# Patient Record
Sex: FEMALE | URBAN - METROPOLITAN AREA
[De-identification: names, ages, dates, MRNs, and addresses within clinical notes are randomized per-mention and may not be internally consistent; named-entity substitution may affect disease eponyms.]

---

## 2021-05-17 ENCOUNTER — OFFICE VISIT (OUTPATIENT)
Dept: URBAN - METROPOLITAN AREA CLINIC 23 | Facility: CLINIC | Age: 66
End: 2021-05-17
Payer: COMMERCIAL

## 2021-05-17 ENCOUNTER — OFFICE VISIT (OUTPATIENT)
Dept: URBAN - METROPOLITAN AREA CLINIC 32 | Facility: CLINIC | Age: 66
End: 2021-05-17
Payer: COMMERCIAL

## 2021-05-17 DIAGNOSIS — T85.22XA DISPLACEMENT OF INTRAOCULAR LENS, INITIAL ENCOUNTER: Primary | ICD-10-CM

## 2021-05-17 DIAGNOSIS — H27.131: Primary | ICD-10-CM

## 2021-05-17 DIAGNOSIS — H43.311 VITREOUS MEMBRANES AND STRANDS, RIGHT EYE: ICD-10-CM

## 2021-05-17 PROCEDURE — 99214 OFFICE O/P EST MOD 30 MIN: CPT | Performed by: OPHTHALMOLOGY

## 2021-05-17 PROCEDURE — 99204 OFFICE O/P NEW MOD 45 MIN: CPT | Performed by: OPHTHALMOLOGY

## 2021-05-17 PROCEDURE — 92134 CPTRZ OPH DX IMG PST SGM RTA: CPT | Performed by: OPHTHALMOLOGY

## 2021-05-17 RX ORDER — PREDNISOLONE ACETATE 10 MG/ML
1 % SUSPENSION/ DROPS OPHTHALMIC
Qty: 10 | Refills: 5 | Status: INACTIVE
Start: 2021-05-17 | End: 2021-09-14

## 2021-05-17 RX ORDER — OFLOXACIN 3 MG/ML
0.3 % SOLUTION/ DROPS OPHTHALMIC
Qty: 5 | Refills: 3 | Status: INACTIVE
Start: 2021-05-17 | End: 2021-09-14

## 2021-05-17 ASSESSMENT — INTRAOCULAR PRESSURE
OD: 22
OS: 21
OD: 22
OS: 21

## 2021-05-17 NOTE — IMPRESSION/PLAN
Impression: Displacement of intraocular lens, initial encounter: T85.22XA. Plan: Discussed diagnosis in detail with patient. Discussed treatment plan with patient. Patient understands Oculoplastics surgical intervention with myself is not required. Patient referred to see Dr. Robert Vazquez, same day. No further Oculoplastics intervention needed at this time. *Patient is currently being treated during COVID-19 epidemic, which limits our availability to establish proper follow up care. Patient understands these limitations, and is aware that we will continue treatment and follow up based on our availability, as determined by local state and federal guidelines.

## 2021-05-17 NOTE — IMPRESSION/PLAN
Impression: Vitreous membranes and strands, right eye: H43.311. Right. Condition: unstable. Vision: vision affected. Plan: Discussed diagnosis in detail with patient. Surgical treatment is required - see notes above.

## 2021-05-17 NOTE — IMPRESSION/PLAN
Impression: Dislocated post lens of rt eye post Trauma: H27.131. Right. Condition: new problem addtl w/u needed. Vision: vision affected. Plan: Discussed diagnosis in detail with patient. Discussed risks of progression. Surgical treatment is required to remove dislocated lens, recommend TSPCIOL RIGHT EYE for possible vision improvement. Discussed surgery in great length. Surgical risks and benefits were discussed, explained and understood by patient. All questions answered. Patient elects to proceed with recommendation. RL1. OCT unable to obtain OD. Erx Prednisolone and Ofloxacin to patient's pharmacy. Advise patient surgery is not an emergency. Start Prednisolone ASAP to be used OD QID.

## 2021-05-24 ENCOUNTER — TESTING ONLY (OUTPATIENT)
Dept: URBAN - METROPOLITAN AREA CLINIC 32 | Facility: CLINIC | Age: 66
End: 2021-05-24
Payer: COMMERCIAL

## 2021-05-24 ASSESSMENT — PACHYMETRY
OD: 2.49
OS: 22.72
OD: 22.57
OS: 2.34

## 2021-06-08 ENCOUNTER — SURGERY (OUTPATIENT)
Dept: URBAN - METROPOLITAN AREA SURGERY 11 | Facility: SURGERY | Age: 66
End: 2021-06-08
Payer: COMMERCIAL

## 2021-06-08 DIAGNOSIS — H33.011 RETINAL DETACHMENT WITH SINGLE BREAK, RIGHT EYE: Primary | ICD-10-CM

## 2021-06-08 PROCEDURE — 67108 REPAIR DETACHED RETINA: CPT | Performed by: OPHTHALMOLOGY

## 2021-06-09 ENCOUNTER — POST-OPERATIVE VISIT (OUTPATIENT)
Dept: URBAN - METROPOLITAN AREA CLINIC 32 | Facility: CLINIC | Age: 66
End: 2021-06-09

## 2021-06-09 PROCEDURE — 99024 POSTOP FOLLOW-UP VISIT: CPT | Performed by: OPTOMETRIST

## 2021-06-09 RX ORDER — BRIMONIDINE TARTRATE, TIMOLOL MALEATE 2; 5 MG/ML; MG/ML
SOLUTION/ DROPS OPHTHALMIC
Qty: 15 | Refills: 3 | Status: INACTIVE
Start: 2021-06-09 | End: 2021-09-06

## 2021-06-09 ASSESSMENT — INTRAOCULAR PRESSURE
OD: 34
OS: 14

## 2021-06-09 NOTE — IMPRESSION/PLAN
Impression: S/P Laser photocoagulation; PPVx 25g;            Endo laser; Intraocular Injections vitreous substitutes OD - 1 Day. Encounter for surgical aftercare following surgery on a sense organ  Z48.810. Plan: The surgical eye(s) is improving well. Continue to follow current drop plan and post operative instructions. Recommend artificial tears throughout post operative period.  RTC for scheduled follow up. start combigan BID OD

## 2021-06-14 ENCOUNTER — POST-OPERATIVE VISIT (OUTPATIENT)
Dept: URBAN - METROPOLITAN AREA CLINIC 32 | Facility: CLINIC | Age: 66
End: 2021-06-14

## 2021-06-14 PROCEDURE — 99024 POSTOP FOLLOW-UP VISIT: CPT | Performed by: OPTOMETRIST

## 2021-06-14 ASSESSMENT — INTRAOCULAR PRESSURE
OD: 18
OS: 14

## 2021-06-14 NOTE — IMPRESSION/PLAN
Impression: S/P Laser photocoagulation; PPVx 25g;            Endo laser; Intraocular Injections vitreous substitutes OD - 6 Days. Encounter for surgical aftercare following surgery on a sense organ  Z48.810.  Plan: Return in 1-2 weeks with Dr. Pam Ramirez for PO --Continue Prednisolone acetate 1% QID 
-- Continue Combigan BID

## 2021-06-28 ENCOUNTER — POST-OPERATIVE VISIT (OUTPATIENT)
Dept: URBAN - METROPOLITAN AREA CLINIC 32 | Facility: CLINIC | Age: 66
End: 2021-06-28

## 2021-06-28 DIAGNOSIS — Z48.810 ENCOUNTER FOR SURGICAL AFTERCARE FOLLOWING SURGERY ON A SENSE ORGAN: Primary | ICD-10-CM

## 2021-06-28 PROCEDURE — 99024 POSTOP FOLLOW-UP VISIT: CPT | Performed by: OPHTHALMOLOGY

## 2021-06-28 ASSESSMENT — INTRAOCULAR PRESSURE
OD: 17
OS: 15

## 2021-06-28 NOTE — IMPRESSION/PLAN
Impression: S/P Laser photocoagulation; PPVx 25g;            Endo laser; Intraocular Injections vitreous substitutes OD - 20 Days. Encounter for surgical aftercare following surgery on a sense organ  Z48.810. Excellent post op course   Post operative instructions reviewed - Condition is improving - Plan: Due to Coronavirus COVID-19 pandemic and National Emergency, minimal Slit Lamp examination performed. Exam OD shows PFO in [facility], epithelial defect, and the retina is fully attached. Unable to obtain OCT OD. Advised patient and her  that during surgery, a retinal detachment was found and PFO was inserted. Advised patient she may lay down and sleep normally. Based on today's findings, recommend PFO removal OD. All risks and benefits discussed. All questions answered. Patient elects to proceed with recommendation. At this time, the eye is still inflamed, therefore a secondary implant surgery can be discussed at a later dater after PFO is removed. Advised patient and her  it is unlikely the vision will be good at this time due to no IOL being in the eye. Recommend patient start Lacrilube QID OD to help with the irritation. ERxed drops to patients pharmacy for PFO removal: Ofloxacin and Prednisolone --Restart Prednisolone QID OD until the bottle is empty and Ofloxacin QID OD starting the day prior to sx Diagnosis Code: H33.011 Retinal detachment with single break, right eye

## 2021-07-06 ENCOUNTER — SURGERY (OUTPATIENT)
Dept: URBAN - METROPOLITAN AREA SURGERY 11 | Facility: SURGERY | Age: 66
End: 2021-07-06
Payer: COMMERCIAL

## 2021-07-06 PROCEDURE — 67036 REMOVAL OF INNER EYE FLUID: CPT | Performed by: OPHTHALMOLOGY

## 2021-07-07 ENCOUNTER — POST-OPERATIVE VISIT (OUTPATIENT)
Dept: URBAN - METROPOLITAN AREA CLINIC 32 | Facility: CLINIC | Age: 66
End: 2021-07-07

## 2021-07-07 PROCEDURE — 99024 POSTOP FOLLOW-UP VISIT: CPT | Performed by: OPTOMETRIST

## 2021-07-07 ASSESSMENT — INTRAOCULAR PRESSURE
OS: 14
OD: 15

## 2021-07-07 NOTE — IMPRESSION/PLAN
Impression: S/P PPVx 25g; AFX (Air Fluid Gas Exchange); PFO removal OD - 1 Day. Encounter for surgical aftercare following surgery on a sense organ  Z48.810. Condition is improving - Plan: The surgical eye(s) is improving well. Continue to follow current drop plan and post operative instructions. Recommend artificial tears throughout post operative period. RTC for scheduled follow up.  --Continue Ofloxacin 0.3% QID x 7 days then d/c
--Continue Combigan BID OD
--Continue Pred-Acetate QID OD until Dr. Pam Ramirez says otherwise

## 2021-07-12 ENCOUNTER — POST-OPERATIVE VISIT (OUTPATIENT)
Dept: URBAN - METROPOLITAN AREA CLINIC 32 | Facility: CLINIC | Age: 66
End: 2021-07-12

## 2021-07-12 PROCEDURE — 99024 POSTOP FOLLOW-UP VISIT: CPT | Performed by: OPHTHALMOLOGY

## 2021-07-12 ASSESSMENT — INTRAOCULAR PRESSURE
OS: 14
OD: 15

## 2021-07-12 NOTE — IMPRESSION/PLAN
Impression: S/P PPVx 25g; AFX (Air Fluid Gas Exchange); PFO removal OD - 6 Days. Encounter for surgical aftercare following surgery on a sense organ  Z48.810. Excellent post op course   Post operative instructions reviewed - Condition is improving - Plan: Due to Coronavirus COVID-19 pandemic and National Emergency, minimal Slit Lamp examination performed. Exam OD shows the retina is fully attached, large epithelial defect, and minimal visible corneal edema. Unable to obtain OCT OD today. Based on findings, recommend patient see Dr Pranay Fitzgerald for a corneal consult. Recommend a retina follow - up in 1 month to reassess the condition. Advised patient and her  that we want to continue to let the eye heal and ensure that it is stable before inserting and IOL lens. Patient may return to work, no restrictions, patient was provided with a return to work letter today.  --Continue Prednisolone acetate 1% QID OD until the bottle is empty, continue using Artificial tears Gel QID OD

## 2021-07-20 ENCOUNTER — OFFICE VISIT (OUTPATIENT)
Dept: URBAN - METROPOLITAN AREA CLINIC 32 | Facility: CLINIC | Age: 66
End: 2021-07-20
Payer: COMMERCIAL

## 2021-07-20 DIAGNOSIS — S05.00XA CORNEAL ABRASION W/O FB OF EYE, INITIAL ENCOUNTER: Primary | ICD-10-CM

## 2021-07-20 PROCEDURE — 92071 CONTACT LENS FITTING FOR TX: CPT | Performed by: OPHTHALMOLOGY

## 2021-07-20 PROCEDURE — 99213 OFFICE O/P EST LOW 20 MIN: CPT | Performed by: OPHTHALMOLOGY

## 2021-07-20 ASSESSMENT — INTRAOCULAR PRESSURE
OS: 15
OD: 17

## 2021-07-20 NOTE — IMPRESSION/PLAN
Impression: Corneal abrasion w/o FB of eye, initial encounter: S05.00xA. Condition: quality of life issue. Vision: vision affected. Plan: Discussed diagnosis in detail with patient. Discussed risks of progression. Discussed treatment options with patient. Recommend continuing PF and Ofloxacin QID OD, continue Combigan BID OD and refresh QID OD. Recommend BCL OD. R/B/A discussed and understood by patient and patient elects to proceed with BCL OD as indicated. Will continue to observe. Call if symptoms worsen.

## 2021-07-28 ENCOUNTER — OFFICE VISIT (OUTPATIENT)
Dept: URBAN - METROPOLITAN AREA CLINIC 23 | Facility: CLINIC | Age: 66
End: 2021-07-28
Payer: COMMERCIAL

## 2021-07-28 DIAGNOSIS — S05.00XS CORNEAL ABRASION W/O FB OF EYE, SEQUELA: Primary | ICD-10-CM

## 2021-07-28 PROCEDURE — 99213 OFFICE O/P EST LOW 20 MIN: CPT | Performed by: OPHTHALMOLOGY

## 2021-07-28 NOTE — IMPRESSION/PLAN
Impression: Corneal abrasion w/o FB of eye, sequela: S05.00XS. Condition: quality of life issue. Symptoms: will treat with meds. Plan: Discussed diagnosis in detail with patient. Discussed treatment options with patient. Continue PF and Ofloxacin QID OD. Continue refresh tears QID OD. Continue Combigan BID OD. BCL not replaced OD today. Recommend patching OD at night. Will continue to monitor. Call if symptoms worsen.

## 2021-08-03 ENCOUNTER — OFFICE VISIT (OUTPATIENT)
Dept: URBAN - METROPOLITAN AREA CLINIC 23 | Facility: CLINIC | Age: 66
End: 2021-08-03
Payer: COMMERCIAL

## 2021-08-03 PROCEDURE — 99213 OFFICE O/P EST LOW 20 MIN: CPT | Performed by: OPHTHALMOLOGY

## 2021-08-03 ASSESSMENT — INTRAOCULAR PRESSURE
OS: 14
OD: 17

## 2021-08-03 NOTE — IMPRESSION/PLAN
Impression: Corneal abrasion w/o FB of eye, sequela: S05.00XS. Condition: quality of life issue. Symptoms: will treat with meds. Plan: Discussed diagnosis in detail with patient. Discussed treatment options with patient. Continue PF and Ofloxacin QID OD. Continue refresh tears QID OD. Continue Combigan BID OD. Recommend patching OD at night. Will continue to monitor. Recommend consultation with Oculoplastics Specialist for suture tarsorrhaphy.

## 2021-08-09 ENCOUNTER — POST-OPERATIVE VISIT (OUTPATIENT)
Dept: URBAN - METROPOLITAN AREA CLINIC 32 | Facility: CLINIC | Age: 66
End: 2021-08-09

## 2021-08-09 PROCEDURE — 99024 POSTOP FOLLOW-UP VISIT: CPT | Performed by: OPHTHALMOLOGY

## 2021-08-09 ASSESSMENT — INTRAOCULAR PRESSURE
OD: 20
OS: 16

## 2021-08-09 NOTE — IMPRESSION/PLAN
Impression: S/P PPVx 25g; AFX (Air Fluid Gas Exchange); PFO removal OD - 34 Days. Encounter for surgical aftercare following surgery on a sense organ  Z48.810. Excellent post op course   Post operative instructions reviewed - OCT unable OD. Retina appears fully attached. Recommend consult with Dr. Seda Brito for Lid Evaluation. Plan: Return with Dr. Seda Brito for Lid Consult and 4-6 weeks with Dr. Shauna Bruce for PO --Continue Prednisolone acetate 1% --Advised patient to use artificial tears for comfort.

## 2021-08-26 ENCOUNTER — OFFICE VISIT (OUTPATIENT)
Dept: URBAN - METROPOLITAN AREA CLINIC 23 | Facility: CLINIC | Age: 66
End: 2021-08-26
Payer: COMMERCIAL

## 2021-08-26 PROCEDURE — 99213 OFFICE O/P EST LOW 20 MIN: CPT | Performed by: OPHTHALMOLOGY

## 2021-08-26 ASSESSMENT — INTRAOCULAR PRESSURE
OS: 10
OD: 16

## 2021-08-26 NOTE — IMPRESSION/PLAN
Impression: Corneal scar: H17.9. Right. Condition: established, stable. Symptoms: will continue to monitor. Plan: Discussed diagnosis in detail with patient. Discussed treatment options with patient. No treatment is required at this time, there is no epithelial defect, therefore a tarsorrhaphy is not indicated. She does have a dense corneal scar and may need follow up with Isabel Love.

## 2021-09-14 ENCOUNTER — OFFICE VISIT (OUTPATIENT)
Dept: URBAN - METROPOLITAN AREA CLINIC 32 | Facility: CLINIC | Age: 66
End: 2021-09-14
Payer: COMMERCIAL

## 2021-09-14 DIAGNOSIS — H17.89 OTHER CORNEAL SCARS AND OPACITIES: Primary | ICD-10-CM

## 2021-09-14 DIAGNOSIS — H17.9 UNSPECIFIED CORNEAL SCAR AND OPACITY: ICD-10-CM

## 2021-09-14 PROCEDURE — 99213 OFFICE O/P EST LOW 20 MIN: CPT | Performed by: OPHTHALMOLOGY

## 2021-09-14 PROCEDURE — 76514 ECHO EXAM OF EYE THICKNESS: CPT | Performed by: OPHTHALMOLOGY

## 2021-09-14 RX ORDER — PREDNISOLONE ACETATE 10 MG/ML
1 % SUSPENSION/ DROPS OPHTHALMIC
Qty: 10 | Refills: 1 | Status: INACTIVE
Start: 2021-09-14 | End: 2021-10-29

## 2021-09-14 RX ORDER — OFLOXACIN 3 MG/ML
0.3 % SOLUTION/ DROPS OPHTHALMIC
Qty: 5 | Refills: 1 | Status: INACTIVE
Start: 2021-09-14 | End: 2021-10-29

## 2021-09-14 ASSESSMENT — INTRAOCULAR PRESSURE
OD: 20
OS: 11

## 2021-09-14 NOTE — IMPRESSION/PLAN
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 Condition: quality of life issue. Symptoms: could improve with surgery. Vision: vision affected. Plan: Discussed diagnosis in detail with patient. Discussed treatment options with patient. No progression expected. Surgical treatment is required. Surgical risks and benefits were discussed, explained and understood by patient. Patient elects to have surgery. RL-2 Recommend LKP pending retina clearance from Dr Campbell Walters. Discussed possible placement of lens with Dr Campbell Walters. Pt will sees Dr Campbell Walters prior to proceed with Dr Campbell Walters.

## 2021-09-20 ENCOUNTER — POST-OPERATIVE VISIT (OUTPATIENT)
Dept: URBAN - METROPOLITAN AREA CLINIC 32 | Facility: CLINIC | Age: 66
End: 2021-09-20

## 2021-09-20 DIAGNOSIS — H43.391 OTHER VITREOUS OPACITIES, RIGHT EYE: Primary | ICD-10-CM

## 2021-09-20 DIAGNOSIS — H27.01 APHAKIA, RIGHT EYE: ICD-10-CM

## 2021-09-20 PROCEDURE — 99024 POSTOP FOLLOW-UP VISIT: CPT | Performed by: OPHTHALMOLOGY

## 2021-09-20 ASSESSMENT — INTRAOCULAR PRESSURE
OS: 12
OD: 22

## 2021-09-20 NOTE — IMPRESSION/PLAN
Impression: S/P PPVx 25g; AFX (Air Fluid Gas Exchange); PFO removal OD - 76 Days. Encounter for surgical aftercare following surgery on a sense organ  Z48.810. Excellent post op course   Post operative instructions reviewed - Condition is improving - Plan: S/P PPVx 25g; AFX (Air Fluid Gas Exchange); PFO removal OD - 76 Days. Encounter for surgical aftercare following surgery on a sense organ  Z48.810. Excellent post op course   Post operative instructions reviewed - Condition is improving - OCT shows some improvement in the scan, retina appears fully attached. Schedule Lens replacement SX with Dr. Gina Michaels. --Continue Prednisolone acetate 1% --Advised patient to use artificial tears for comfort.

## 2021-09-21 ENCOUNTER — TESTING ONLY (OUTPATIENT)
Dept: URBAN - METROPOLITAN AREA CLINIC 32 | Facility: CLINIC | Age: 66
End: 2021-09-21
Payer: COMMERCIAL

## 2021-09-21 ASSESSMENT — PACHYMETRY
OD: 22.70
OS: 2.15
OD: 5.54
OS: 22.78

## 2021-09-28 ENCOUNTER — SURGERY (OUTPATIENT)
Dept: URBAN - METROPOLITAN AREA SURGERY 11 | Facility: SURGERY | Age: 66
End: 2021-09-28
Payer: COMMERCIAL

## 2021-09-28 PROCEDURE — 67036 REMOVAL OF INNER EYE FLUID: CPT | Performed by: OPHTHALMOLOGY

## 2021-09-29 ENCOUNTER — POST-OPERATIVE VISIT (OUTPATIENT)
Dept: URBAN - METROPOLITAN AREA CLINIC 32 | Facility: CLINIC | Age: 66
End: 2021-09-29

## 2021-09-29 PROCEDURE — 99024 POSTOP FOLLOW-UP VISIT: CPT | Performed by: OPTOMETRIST

## 2021-09-29 ASSESSMENT — INTRAOCULAR PRESSURE
OS: 14
OD: 18

## 2021-09-29 NOTE — IMPRESSION/PLAN
Impression: S/P [place] Vitrectomy 25ga; AFX (Air Fluid Gas Exchange); Transcleral Suture Posterior Chamber Intraocular Lens Implant; Perfluorocarbon Liquids Removal OD - 1 Day. Encounter for surgical aftercare following surgery on a sense organ  Z48.810. Condition is improving - Plan: The surgical eye(s) is improving well. Continue to follow current drop plan and post operative instructions. Recommend artificial tears throughout post operative period. RTC for scheduled follow up. --Continue Ofloxacin 0.3% QID OD
--Continue Pred-Acetate QID OD
--Advised patient to use artificial tears for comfort.

## 2021-10-06 ENCOUNTER — POST-OPERATIVE VISIT (OUTPATIENT)
Dept: URBAN - METROPOLITAN AREA CLINIC 23 | Facility: CLINIC | Age: 66
End: 2021-10-06

## 2021-10-06 PROCEDURE — 99024 POSTOP FOLLOW-UP VISIT: CPT | Performed by: OPTOMETRIST

## 2021-10-06 NOTE — IMPRESSION/PLAN
Impression: S/P Pars Plana Vitrectomy 25ga; AFX (Air Fluid Gas Exchange); Transcleral Suture Posterior Chamber Intraocular Lens Implant; Perfluorocarbon Liquids Removal OD - 8 Days. Encounter for surgical aftercare following surgery on a sense organ  Z48.810. Post operative instructions reviewed - Condition is improving - Plan: Use medication as directed above and on handout. Return if sudden vision change or increasing pain. --Continue Pred Forte QID OD until bottle is gone.

## 2021-10-29 ENCOUNTER — POST-OPERATIVE VISIT (OUTPATIENT)
Dept: URBAN - METROPOLITAN AREA CLINIC 23 | Facility: CLINIC | Age: 66
End: 2021-10-29

## 2021-10-29 PROCEDURE — 99024 POSTOP FOLLOW-UP VISIT: CPT | Performed by: OPHTHALMOLOGY

## 2021-10-29 RX ORDER — BROMFENAC SODIUM 0.7 MG/ML
0.07 % SOLUTION/ DROPS OPHTHALMIC
Qty: 3 | Refills: 5 | Status: ACTIVE
Start: 2021-10-29

## 2021-10-29 ASSESSMENT — INTRAOCULAR PRESSURE
OD: 24
OS: 13

## 2021-10-29 NOTE — IMPRESSION/PLAN
Impression: S/P Pars Plana Vitrectomy 25ga; AFX (Air Fluid Gas Exchange); Transcleral Suture Posterior Chamber Intraocular Lens Implant; Perfluorocarbon Liquids Removal OD - 31 Days. Encounter for surgical aftercare following surgery on a sense organ  Z48.810. Excellent post op course   Post operative instructions reviewed - Condition is improving - Plan: Due to Coronavirus COVID-19 pandemic and National Emergency, minimal Slit Lamp examination performed. Exam OD shows the retina appears fully attached, TSPCIOL in good positions, scarring on the cornea and sutures intact. 1 suture removed in office today with use of Proparacaine and jewelers. . OCT shows poor view and Optos shows hazy view    No treatment is required at this time. Recommend a follow-up with Dr Reynold Ma for Cornea Evaluation. Recommend patient start Prolensa QD OD and use artificial tears gel. ERxed drops to pharmacy on file.  --Discontinue Prednisolone acetate 1% due to slightly elevated IOP

## 2021-11-23 ENCOUNTER — OFFICE VISIT (OUTPATIENT)
Dept: URBAN - METROPOLITAN AREA CLINIC 23 | Facility: CLINIC | Age: 66
End: 2021-11-23
Payer: COMMERCIAL

## 2021-11-23 DIAGNOSIS — H18.421 BAND KERATOPATHY, RIGHT EYE: Primary | ICD-10-CM

## 2021-11-23 PROCEDURE — 99213 OFFICE O/P EST LOW 20 MIN: CPT | Performed by: OPHTHALMOLOGY

## 2021-11-23 RX ORDER — PREDNISOLONE ACETATE 10 MG/ML
1 % SUSPENSION/ DROPS OPHTHALMIC
Qty: 10 | Refills: 1 | Status: ACTIVE
Start: 2021-11-23

## 2021-11-23 RX ORDER — OFLOXACIN 3 MG/ML
0.3 % SOLUTION/ DROPS OPHTHALMIC
Qty: 5 | Refills: 1 | Status: INACTIVE
Start: 2021-11-23 | End: 2022-01-24

## 2021-11-23 ASSESSMENT — INTRAOCULAR PRESSURE
OD: 27
OS: 11

## 2021-11-23 ASSESSMENT — KERATOMETRY: OS: 41.88

## 2021-11-23 NOTE — IMPRESSION/PLAN
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 Condition: quality of life issue. Symptoms: may improve with surgery. Plan: Discussed diagnosis in detail with patient. Discussed risks of progression. Discussed treatment options with patient. Surgical treatment is required. Surgical risks and benefits were discussed, explained and understood by patient. Patient elects to have surgery.  RL-2

## 2022-01-21 ENCOUNTER — SURGERY (OUTPATIENT)
Dept: URBAN - METROPOLITAN AREA SURGERY 11 | Facility: SURGERY | Age: 67
End: 2022-01-21
Payer: COMMERCIAL

## 2022-01-21 PROCEDURE — 65426 REMOVAL OF EYE LESION: CPT | Performed by: OPHTHALMOLOGY

## 2022-01-21 RX ORDER — HYDROCODONE BITARTRATE AND ACETAMINOPHEN 10; 325 MG/1; MG/1
TABLET ORAL
Qty: 16 | Refills: 0 | Status: ACTIVE
Start: 2022-01-21

## 2022-01-24 ENCOUNTER — POST-OPERATIVE VISIT (OUTPATIENT)
Dept: URBAN - METROPOLITAN AREA CLINIC 23 | Facility: CLINIC | Age: 67
End: 2022-01-24

## 2022-01-24 PROCEDURE — 99024 POSTOP FOLLOW-UP VISIT: CPT | Performed by: OPTOMETRIST

## 2022-01-24 ASSESSMENT — INTRAOCULAR PRESSURE
OS: 10
OD: 26

## 2022-01-24 NOTE — IMPRESSION/PLAN
Impression: S/P Band Chelation OD - 3 Days. Encounter for surgical aftercare following surgery on a sense organ  Z48.810. Plan: Pt edu on all findings. Appearance appears stable today. Removed BCL OD and replaced new BCL OD today in office. Continue Prolensa QD OD and Ofloxacin QID OD. RTC as scheduled w/ Dr. Cali Grant 1-.

## 2022-01-28 ENCOUNTER — POST-OPERATIVE VISIT (OUTPATIENT)
Dept: URBAN - METROPOLITAN AREA CLINIC 33 | Facility: CLINIC | Age: 67
End: 2022-01-28

## 2022-01-28 PROCEDURE — 99024 POSTOP FOLLOW-UP VISIT: CPT | Performed by: OPHTHALMOLOGY

## 2022-01-28 ASSESSMENT — INTRAOCULAR PRESSURE
OS: 11
OD: 22

## 2022-01-28 NOTE — IMPRESSION/PLAN
Impression: S/P Band Chelation OD - 7 Days. Encounter for surgical aftercare following surgery on a sense organ  Z48.810.  Excellent post op course   Post operative instructions reviewed - Condition is improving - Plan: Return 1 wk

## 2022-02-03 ENCOUNTER — POST-OPERATIVE VISIT (OUTPATIENT)
Dept: URBAN - METROPOLITAN AREA CLINIC 23 | Facility: CLINIC | Age: 67
End: 2022-02-03

## 2022-02-03 PROCEDURE — 99024 POSTOP FOLLOW-UP VISIT: CPT | Performed by: OPTOMETRIST

## 2022-02-03 ASSESSMENT — INTRAOCULAR PRESSURE
OS: 10
OD: 22

## 2022-02-03 NOTE — IMPRESSION/PLAN
Impression: S/P Band Chelation OD - 13 Days. Encounter for surgical aftercare following surgery on a sense organ  Z48.810. Excellent post op course   Post operative instructions reviewed - Plan: pt edu on all findings. bcl removed today. stop oflox. continue pred qid od until f/u.  f/u w/ dr Kev Byrd in 3 weeks. pt edu on potential [facility] limited/guarded prognosis due to irregular cornea. --Advised patient to use artificial tears for comfort. --Advised patient to use artificial tears for comfort. --Continue Pred Forte

## 2022-02-28 ENCOUNTER — POST-OPERATIVE VISIT (OUTPATIENT)
Dept: URBAN - METROPOLITAN AREA CLINIC 32 | Facility: CLINIC | Age: 67
End: 2022-02-28
Payer: COMMERCIAL

## 2022-02-28 PROCEDURE — 99024 POSTOP FOLLOW-UP VISIT: CPT | Performed by: OPHTHALMOLOGY

## 2022-02-28 RX ORDER — OFLOXACIN 3 MG/ML
0.3 % SOLUTION/ DROPS OPHTHALMIC
Qty: 5 | Refills: 1 | Status: ACTIVE
Start: 2022-02-28

## 2022-02-28 ASSESSMENT — INTRAOCULAR PRESSURE
OS: 12
OD: 18

## 2022-02-28 NOTE — IMPRESSION/PLAN
Impression: S/P  Chelation OD - 38 Days. Encounter for surgical aftercare following surgery on a sense organ  Z48.810. Post operative instructions reviewed - Plan: BCL placed OD today. Will consider oxervate if condition does not improve.  Observe Continue PF QID OD, Restart Ofloxacin QID OD

## 2022-03-28 ENCOUNTER — POST-OPERATIVE VISIT (OUTPATIENT)
Dept: URBAN - METROPOLITAN AREA CLINIC 32 | Facility: CLINIC | Age: 67
End: 2022-03-28
Payer: COMMERCIAL

## 2022-03-28 PROCEDURE — 99024 POSTOP FOLLOW-UP VISIT: CPT | Performed by: OPHTHALMOLOGY

## 2022-03-28 ASSESSMENT — INTRAOCULAR PRESSURE
OS: 14
OD: 11

## 2022-03-28 NOTE — IMPRESSION/PLAN
Impression: S/P Band Chelation OD - 66 Days. Encounter for surgical aftercare following surgery on a sense organ  Z48.810. Excellent post op course   Post operative instructions reviewed - Condition is improving - Plan: BCL removed OD today and not replaced. Observe. --Advised patient to use artificial tears for comfort.

## 2022-04-25 ENCOUNTER — POST-OPERATIVE VISIT (OUTPATIENT)
Dept: URBAN - METROPOLITAN AREA CLINIC 32 | Facility: CLINIC | Age: 67
End: 2022-04-25
Payer: COMMERCIAL

## 2022-04-25 DIAGNOSIS — Z48.810 ENCOUNTER FOR SURGICAL AFTERCARE FOLLOWING SURGERY ON A SENSE ORGAN: Primary | ICD-10-CM

## 2022-04-25 PROCEDURE — 99024 POSTOP FOLLOW-UP VISIT: CPT | Performed by: OPHTHALMOLOGY

## 2022-04-25 RX ORDER — PREDNISOLONE ACETATE 10 MG/ML
1 % SUSPENSION/ DROPS OPHTHALMIC
Qty: 15 | Refills: 3 | Status: ACTIVE
Start: 2022-04-25

## 2022-04-25 ASSESSMENT — INTRAOCULAR PRESSURE
OS: 14
OD: 12

## 2022-04-25 NOTE — IMPRESSION/PLAN
Impression: S/P Band Keratopathy Chelation OD - 94 Days. Encounter for surgical aftercare following surgery on a sense organ  Z48.810.  Excellent post op course   Post operative instructions reviewed - Condition is improving - Plan: D/C Ofloxacin and restart PF BID-TID OD

## 2022-07-18 ENCOUNTER — OFFICE VISIT (OUTPATIENT)
Dept: URBAN - METROPOLITAN AREA CLINIC 32 | Facility: CLINIC | Age: 67
End: 2022-07-18
Payer: COMMERCIAL

## 2022-07-18 DIAGNOSIS — H17.89 OTHER CORNEAL SCARS AND OPACITIES: Primary | ICD-10-CM

## 2022-07-18 PROCEDURE — 99213 OFFICE O/P EST LOW 20 MIN: CPT | Performed by: OPHTHALMOLOGY

## 2022-07-18 ASSESSMENT — INTRAOCULAR PRESSURE
OS: 18
OD: 19

## 2022-07-18 NOTE — IMPRESSION/PLAN
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 S/P Chelation for band keratopathy Plan: Discussed diagnosis in detail with patient. Discussed treatment options with patient. Continue using current medication(s). PF TID OD. Recommend lubrication OD. Recommend patching at night. Discussed possible need for additional surgery OD in the future. Defer at this time.

## 2022-09-12 ENCOUNTER — OFFICE VISIT (OUTPATIENT)
Dept: URBAN - METROPOLITAN AREA CLINIC 32 | Facility: CLINIC | Age: 67
End: 2022-09-12
Payer: COMMERCIAL

## 2022-09-12 DIAGNOSIS — H17.89 OTHER CORNEAL SCARS AND OPACITIES: Primary | ICD-10-CM

## 2022-09-12 PROCEDURE — 99213 OFFICE O/P EST LOW 20 MIN: CPT | Performed by: OPHTHALMOLOGY

## 2022-09-12 RX ORDER — PREDNISOLONE ACETATE 10 MG/ML
1 % SUSPENSION/ DROPS OPHTHALMIC
Qty: 10 | Refills: 1 | Status: ACTIVE
Start: 2022-09-12

## 2022-09-12 RX ORDER — OFLOXACIN 3 MG/ML
0.3 % SOLUTION/ DROPS OPHTHALMIC
Qty: 5 | Refills: 1 | Status: ACTIVE
Start: 2022-09-12

## 2022-09-12 ASSESSMENT — INTRAOCULAR PRESSURE
OD: 18
OS: 13

## 2022-09-12 NOTE — IMPRESSION/PLAN
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 Condition: quality of life issue. Symptoms: could improve with surgery. Vision: vision affected. S/P Chelation Plan: Discussed diagnosis in detail with patient. Discussed treatment options with patient. Surgery not indicated, will monitor. Surgical risks and benefits were discussed, explained and understood by patient. Patient elects to have surgery. RL-2 **Patient needs retina clearance prior to scheduling PKP OD**

## 2022-10-26 ENCOUNTER — OFFICE VISIT (OUTPATIENT)
Dept: URBAN - METROPOLITAN AREA CLINIC 41 | Facility: CLINIC | Age: 67
End: 2022-10-26
Payer: COMMERCIAL

## 2022-10-26 DIAGNOSIS — H33.8 OTHER RETINAL DETACHMENT: Primary | ICD-10-CM

## 2022-10-26 DIAGNOSIS — H17.9 CORNEAL SCAR: ICD-10-CM

## 2022-10-26 DIAGNOSIS — H35.372 PUCKERING OF MACULA, LEFT EYE: ICD-10-CM

## 2022-10-26 DIAGNOSIS — Z96.1 PRESENCE OF PSEUDOPHAKIA: ICD-10-CM

## 2022-10-26 PROCEDURE — 92134 CPTRZ OPH DX IMG PST SGM RTA: CPT | Performed by: OPHTHALMOLOGY

## 2022-10-26 PROCEDURE — 99204 OFFICE O/P NEW MOD 45 MIN: CPT | Performed by: OPHTHALMOLOGY

## 2022-10-26 ASSESSMENT — INTRAOCULAR PRESSURE
OS: 15
OD: 19

## 2022-10-26 NOTE — IMPRESSION/PLAN
Impression: Puckering of macula, left eye: H35.372. Plan: Mild ERM OS confirmed on OCT testing. Not visually significant. Observe.

## 2022-10-26 NOTE — IMPRESSION/PLAN
Impression: Corneal scar: H17.9 Right. Plan: Vascularized corneal scar OD. S/P chelation of band keratopathy OD. PKP as scheduled.

## 2022-10-26 NOTE — IMPRESSION/PLAN
Impression: Other retinal detachment: H33.8 Right.
s/p PPV/EL/retained PFO 6/8/21
s/p sutured IOL and removal of PFO Plan: H/O blunt trauma with VH, traumatic cataract, and RD OD. S/P successful repair of RD OD. S/P lensectomy and subsequent placement of Akreos scleral-sutured IOL OD. Retina remains fully attached and IOL is centered and stable. Unable to obtain adequate macular OCT. No retinal contraindication to proceeding with PKP as scheduled. 

RTC PRN